# Patient Record
Sex: MALE | Race: OTHER
[De-identification: names, ages, dates, MRNs, and addresses within clinical notes are randomized per-mention and may not be internally consistent; named-entity substitution may affect disease eponyms.]

---

## 2020-06-23 ENCOUNTER — HOSPITAL ENCOUNTER (EMERGENCY)
Dept: HOSPITAL 72 - EMR | Age: 40
Discharge: HOME | End: 2020-06-23
Payer: MEDICAID

## 2020-06-23 VITALS — DIASTOLIC BLOOD PRESSURE: 99 MMHG | SYSTOLIC BLOOD PRESSURE: 154 MMHG

## 2020-06-23 VITALS — WEIGHT: 150 LBS | HEIGHT: 63 IN | BODY MASS INDEX: 26.58 KG/M2

## 2020-06-23 DIAGNOSIS — R53.1: Primary | ICD-10-CM

## 2020-06-23 DIAGNOSIS — F41.9: ICD-10-CM

## 2020-06-23 LAB
ADD MANUAL DIFF: NO
ANION GAP SERPL CALC-SCNC: 12 MMOL/L (ref 5–15)
BASOPHILS NFR BLD AUTO: 0.8 % (ref 0–2)
BUN SERPL-MCNC: 18 MG/DL (ref 7–18)
CALCIUM SERPL-MCNC: 8.8 MG/DL (ref 8.5–10.1)
CHLORIDE SERPL-SCNC: 100 MMOL/L (ref 98–107)
CO2 SERPL-SCNC: 25 MMOL/L (ref 21–32)
CREAT SERPL-MCNC: 1.1 MG/DL (ref 0.55–1.3)
EOSINOPHIL NFR BLD AUTO: 0.9 % (ref 0–3)
ERYTHROCYTE [DISTWIDTH] IN BLOOD BY AUTOMATED COUNT: 13.7 % (ref 11.6–14.8)
HCT VFR BLD CALC: 49.7 % (ref 42–52)
HGB BLD-MCNC: 16.2 G/DL (ref 14.2–18)
LYMPHOCYTES NFR BLD AUTO: 34.9 % (ref 20–45)
MCV RBC AUTO: 89 FL (ref 80–99)
MONOCYTES NFR BLD AUTO: 4.3 % (ref 1–10)
NEUTROPHILS NFR BLD AUTO: 59.1 % (ref 45–75)
PLATELET # BLD: 196 K/UL (ref 150–450)
POTASSIUM SERPL-SCNC: 3.3 MMOL/L (ref 3.5–5.1)
RBC # BLD AUTO: 5.58 M/UL (ref 4.7–6.1)
SODIUM SERPL-SCNC: 137 MMOL/L (ref 136–145)
WBC # BLD AUTO: 7 K/UL (ref 4.8–10.8)

## 2020-06-23 PROCEDURE — 93005 ELECTROCARDIOGRAM TRACING: CPT

## 2020-06-23 PROCEDURE — 80307 DRUG TEST PRSMV CHEM ANLYZR: CPT

## 2020-06-23 PROCEDURE — 99284 EMERGENCY DEPT VISIT MOD MDM: CPT

## 2020-06-23 PROCEDURE — 80048 BASIC METABOLIC PNL TOTAL CA: CPT

## 2020-06-23 PROCEDURE — 83735 ASSAY OF MAGNESIUM: CPT

## 2020-06-23 PROCEDURE — 96360 HYDRATION IV INFUSION INIT: CPT

## 2020-06-23 PROCEDURE — 85025 COMPLETE CBC W/AUTO DIFF WBC: CPT

## 2020-06-23 PROCEDURE — 36415 COLL VENOUS BLD VENIPUNCTURE: CPT

## 2020-06-23 NOTE — EMERGENCY ROOM REPORT
History of Present Illness


General


Chief Complaint:  Generalized Weakness


Source:  Patient, EMS





Present Illness


HPI


Patient presents with reports that he felt a general weakness


He felt a sensation earlier while he was on a computer reports that recently he 

has felt significant


Sensitivity to heat and cool





Denies any chest pain with this denies any vomiting or diarrhea denies any 

fevers or chills


Describes it as a heaviness that he feels


Diffusely


Denies any visual changes with this denies any loss of consciousness denies any 

palpitations he does


Have a sensation of shortness of breath at times however that resolves


Denies any recent travel


And reports that the previous episode in January was similar to this he had 

another episode in March which resolved by itself and today presents for 

similar weakness


Allergies:  


Coded Allergies:  


     No Known Allergies (Unverified , 12/12/17)





COVID-19 Screening


Contact w/high risk pt:  No


Recent Travel to affected area:  No


Experienced COVID-19 symptoms?:  No


COVID-19 Testing performed PTA:  No





Patient History


Past Medical History:  see triage record


Reviewed Nursing Documentation:  PMH: Agreed; PSxH: Agreed





Nursing Documentation-PMH


Past Medical History:  No History, Except For


History Of Psychiatric Problem:  Yes - anxiety





Review of Systems


All Other Systems:  negative except mentioned in HPI





Physical Exam





Vital Signs








  Date Time  Temp Pulse Resp B/P (MAP) Pulse Ox O2 Delivery O2 Flow Rate FiO2


 


6/23/20 18:51 97.9 89 22 161/107 (125) 99 Room Air  








Sp02 EP Interpretation:  reviewed, normal


General Appearance:  well appearing, no apparent distress


Head:  normocephalic, atraumatic


Eyes:  bilateral eye PERRL, bilateral eye EOMI


ENT:  hearing grossly normal, normal pharynx, TMs + canals normal, uvula midline


Neck:  full range of motion, supple, no meningismus, no bony tend


Respiratory:  lungs clear, normal breath sounds, no rhonchi, no respiratory 

distress, no retraction, no accessory muscle use


Cardiovascular #1:  normal peripheral pulses, regular rate, rhythm, no edema, 

no gallop, no JVD, no murmur


Gastrointestinal:  normal bowel sounds, non tender, soft, no mass, no 

organomegaly, non-distended, no guarding, no hernia, no pulsatile mass, no 

rebound


Genitourinary:  no CVA tenderness


Musculoskeletal:  normal inspection


Neurologic:  motor strength/tone normal, CNs III-XII nml as tested, oriented x3

, sensory intact, responsive


Psychiatric:  mood/affect normal


Skin:  no rash


Lymphatic:  normal inspection, no adenopathy





Medical Decision Making


Diagnostic Impression:  


 Primary Impression:  


 weakness


ER Course


Multiple differentials included but not limited to electrolyte pathology anemia,

, cardiac, cardiopulmonary process entertained


Patient's potassium level is minimally low


There is some question regarding episodic hypokalemic syndrome


However the potassium levels are usually lower than this patient remains 

hemodynamically stable





He did not have any follow-up after his initial visit here and it was discussed 

the importance of this


Patient also asking to eat and feels that he does feel better after this 

usually and is stable for close outpatient follow-up





Labs








Test


  6/23/20


19:00 6/23/20


19:20


 


White Blood Count


  7.0 K/UL


(4.8-10.8) 


 


 


Red Blood Count


  5.58 M/UL


(4.70-6.10) 


 


 


Hemoglobin


  16.2 G/DL


(14.2-18.0) 


 


 


Hematocrit


  49.7 %


(42.0-52.0) 


 


 


Mean Corpuscular Volume 89 FL (80-99)  


 


Mean Corpuscular Hemoglobin


  29.1 PG


(27.0-31.0) 


 


 


Mean Corpuscular Hemoglobin


Concent 32.6 G/DL


(32.0-36.0) 


 


 


Red Cell Distribution Width


  13.7 %


(11.6-14.8) 


 


 


Platelet Count


  196 K/UL


(150-450) 


 


 


Mean Platelet Volume


  8.1 FL


(6.5-10.1) 


 


 


Neutrophils (%) (Auto)


  59.1 %


(45.0-75.0) 


 


 


Lymphocytes (%) (Auto)


  34.9 %


(20.0-45.0) 


 


 


Monocytes (%) (Auto)


  4.3 %


(1.0-10.0) 


 


 


Eosinophils (%) (Auto)


  0.9 %


(0.0-3.0) 


 


 


Basophils (%) (Auto)


  0.8 %


(0.0-2.0) 


 


 


Sodium Level


  137 MMOL/L


(136-145) 


 


 


Potassium Level


  3.3 MMOL/L


(3.5-5.1) 


 


 


Chloride Level


  100 MMOL/L


() 


 


 


Carbon Dioxide Level


  25 MMOL/L


(21-32) 


 


 


Anion Gap


  12 mmol/L


(5-15) 


 


 


Blood Urea Nitrogen


  18 mg/dL


(7-18) 


 


 


Creatinine


  1.1 MG/DL


(0.55-1.30) 


 


 


Estimat Glomerular Filtration


Rate > 60 mL/min


(>60) 


 


 


Glucose Level


  119 MG/DL


() 


 


 


Calcium Level


  8.8 MG/DL


(8.5-10.1) 


 


 


Magnesium Level


  2.0 MG/DL


(1.8-2.4) 


 


 


Urine Opiates Screen


  


  Negative


(NEGATIVE)


 


Urine Barbiturates Screen


  


  Negative


(NEGATIVE)


 


Phencyclidine (PCP) Screen


  


  Negative


(NEGATIVE)


 


Urine Amphetamines Screen


  


  Negative


(NEGATIVE)


 


Urine Benzodiazepines Screen


  


  Negative


(NEGATIVE)


 


Urine Cocaine Screen


  


  Negative


(NEGATIVE)


 


Urine Marijuana (THC) Screen


  


  Negative


(NEGATIVE)








EKG Diagnostic Results


Rate:  normal


Rhythm:  NSR


ST Segments:  no acute changes





Rhythm Strip Diag. Results


EP Interpretation:  yes


Rate:  77


Rhythm:  NSR, no PVC's, no ectopy





CT/MRI/US Diagnostic Results


CT/MRI/US Diagnostic Results :  


   Impression


CT head from January reviewed which was negative





Last Vital Signs








  Date Time  Temp Pulse Resp B/P (MAP) Pulse Ox O2 Delivery O2 Flow Rate FiO2


 


6/23/20 19:05 97.9 81 22 154/99 99 Room Air  








Status:  improved


Disposition:  HOME, SELF-CARE


Condition:  Improved


Referrals:  


[facility]











[facility]











[facility]


Patient Instructions:  Weakness





Additional Instructions:  


Patient is provided with the discharge instructions notified to follow up with 

primary doctor in the next 2-3 days otherwise return to the er with any 

worsening symptoms.


Please note that this report is being documented using DRAGON technology.  This 

can lead to erroneous entry secondary to incorrect interpretation by the 

dictating instrument.











Roman Cohen DO Jun 23, 2020 20:17